# Patient Record
Sex: FEMALE | Race: BLACK OR AFRICAN AMERICAN | NOT HISPANIC OR LATINO | URBAN - METROPOLITAN AREA
[De-identification: names, ages, dates, MRNs, and addresses within clinical notes are randomized per-mention and may not be internally consistent; named-entity substitution may affect disease eponyms.]

---

## 2022-12-13 ENCOUNTER — APPOINTMENT (RX ONLY)
Dept: URBAN - METROPOLITAN AREA CLINIC 335 | Facility: CLINIC | Age: 37
Setting detail: DERMATOLOGY
End: 2022-12-13

## 2022-12-13 DIAGNOSIS — L24 IRRITANT CONTACT DERMATITIS: ICD-10-CM | Status: INADEQUATELY CONTROLLED

## 2022-12-13 DIAGNOSIS — L70.0 ACNE VULGARIS: ICD-10-CM | Status: INADEQUATELY CONTROLLED

## 2022-12-13 PROBLEM — L24.9 IRRITANT CONTACT DERMATITIS, UNSPECIFIED CAUSE: Status: ACTIVE | Noted: 2022-12-13

## 2022-12-13 PROCEDURE — ? PRESCRIPTION

## 2022-12-13 PROCEDURE — ? IN-HOUSE DISPENSING PHARMACY

## 2022-12-13 PROCEDURE — 99214 OFFICE O/P EST MOD 30 MIN: CPT

## 2022-12-13 PROCEDURE — ? COUNSELING

## 2022-12-13 RX ORDER — PIMECROLIMUS 10 MG/G
CREAM TOPICAL
Qty: 30 | Refills: 0 | Status: ERX | COMMUNITY
Start: 2022-12-13

## 2022-12-13 RX ORDER — DAPSONE 75 MG/G
GEL TOPICAL
Qty: 90 | Refills: 1 | Status: ERX | COMMUNITY
Start: 2022-12-13

## 2022-12-13 RX ADMIN — DAPSONE: 75 GEL TOPICAL at 00:00

## 2022-12-13 RX ADMIN — PIMECROLIMUS: 10 CREAM TOPICAL at 00:00

## 2022-12-13 ASSESSMENT — LOCATION DETAILED DESCRIPTION DERM
LOCATION DETAILED: RIGHT LATERAL SUPERIOR EYELID
LOCATION DETAILED: LEFT CENTRAL EYEBROW
LOCATION DETAILED: LEFT INFERIOR CENTRAL MALAR CHEEK

## 2022-12-13 ASSESSMENT — LOCATION ZONE DERM
LOCATION ZONE: FACE
LOCATION ZONE: EYELID

## 2022-12-13 ASSESSMENT — LOCATION SIMPLE DESCRIPTION DERM
LOCATION SIMPLE: RIGHT SUPERIOR EYELID
LOCATION SIMPLE: LEFT CHEEK
LOCATION SIMPLE: LEFT EYEBROW

## 2022-12-13 NOTE — PROCEDURE: COUNSELING
Bactrim Counseling:  I discussed with the patient the risks of sulfa antibiotics including but not limited to GI upset, allergic reaction, drug rash, diarrhea, dizziness, photosensitivity, and yeast infections.  Rarely, more serious reactions can occur including but not limited to aplastic anemia, agranulocytosis, methemoglobinemia, blood dyscrasias, liver or kidney failure, lung infiltrates or desquamative/blistering drug rashes.
Doxycycline Pregnancy And Lactation Text: This medication is Pregnancy Category D and not consider safe during pregnancy. It is also excreted in breast milk but is considered safe for shorter treatment courses.
Sarecycline Counseling: Patient advised regarding possible photosensitivity and discoloration of the teeth, skin, lips, tongue and gums.  Patient instructed to avoid sunlight, if possible.  When exposed to sunlight, patients should wear protective clothing, sunglasses, and sunscreen.  The patient was instructed to call the office immediately if the following severe adverse effects occur:  hearing changes, easy bruising/bleeding, severe headache, or vision changes.  The patient verbalized understanding of the proper use and possible adverse effects of sarecycline.  All of the patient's questions and concerns were addressed.
Azelaic Acid Pregnancy And Lactation Text: This medication is considered safe during pregnancy and breast feeding.
Erythromycin Pregnancy And Lactation Text: This medication is Pregnancy Category B and is considered safe during pregnancy. It is also excreted in breast milk.
Aklief Pregnancy And Lactation Text: It is unknown if this medication is safe to use during pregnancy.  It is unknown if this medication is excreted in breast milk.  Breastfeeding women should use the topical cream on the smallest area of the skin for the shortest time needed while breastfeeding.  Do not apply to nipple and areola.
Tazorac Counseling:  Patient advised that medication is irritating and drying.  Patient may need to apply sparingly and wash off after an hour before eventually leaving it on overnight.  The patient verbalized understanding of the proper use and possible adverse effects of tazorac.  All of the patient's questions and concerns were addressed.
Minocycline Counseling: Patient advised regarding possible photosensitivity and discoloration of the teeth, skin, lips, tongue and gums.  Patient instructed to avoid sunlight, if possible.  When exposed to sunlight, patients should wear protective clothing, sunglasses, and sunscreen.  The patient was instructed to call the office immediately if the following severe adverse effects occur:  hearing changes, easy bruising/bleeding, severe headache, or vision changes.  The patient verbalized understanding of the proper use and possible adverse effects of minocycline.  All of the patient's questions and concerns were addressed.
Topical Clindamycin Counseling: Patient counseled that this medication may cause skin irritation or allergic reactions.  In the event of skin irritation, the patient was advised to reduce the amount of the drug applied or use it less frequently.   The patient verbalized understanding of the proper use and possible adverse effects of clindamycin.  All of the patient's questions and concerns were addressed.
Azithromycin Counseling:  I discussed with the patient the risks of azithromycin including but not limited to GI upset, allergic reaction, drug rash, diarrhea, and yeast infections.
Use Enhanced Medication Counseling?: No
Detail Level: Zone
Birth Control Pills Pregnancy And Lactation Text: This medication should be avoided if pregnant and for the first 30 days post-partum.
High Dose Vitamin A Counseling: Side effects reviewed, pt to contact office should one occur.
Tetracycline Pregnancy And Lactation Text: This medication is Pregnancy Category D and not consider safe during pregnancy. It is also excreted in breast milk.
Winlevi Pregnancy And Lactation Text: This medication is considered safe during pregnancy and breastfeeding.
Dapsone Pregnancy And Lactation Text: This medication is Pregnancy Category C and is not considered safe during pregnancy or breast feeding.
Benzoyl Peroxide Counseling: Patient counseled that medicine may cause skin irritation and bleach clothing.  In the event of skin irritation, the patient was advised to reduce the amount of the drug applied or use it less frequently.   The patient verbalized understanding of the proper use and possible adverse effects of benzoyl peroxide.  All of the patient's questions and concerns were addressed.
Topical Sulfur Applications Pregnancy And Lactation Text: This medication is Pregnancy Category C and has an unknown safety profile during pregnancy. It is unknown if this topical medication is excreted in breast milk.
Isotretinoin Counseling: Patient should get monthly blood tests, not donate blood, not drive at night if vision affected, not share medication, and not undergo elective surgery for 6 months after tx completed. Side effects reviewed, pt to contact office should one occur.
Spironolactone Pregnancy And Lactation Text: This medication can cause feminization of the male fetus and should be avoided during pregnancy. The active metabolite is also found in breast milk.
Topical Retinoid counseling:  Patient advised to apply a pea-sized amount only at bedtime and wait 30 minutes after washing their face before applying.  If too drying, patient may add a non-comedogenic moisturizer. The patient verbalized understanding of the proper use and possible adverse effects of retinoids.  All of the patient's questions and concerns were addressed.
Azithromycin Pregnancy And Lactation Text: This medication is considered safe during pregnancy and is also secreted in breast milk.
Erythromycin Counseling:  I discussed with the patient the risks of erythromycin including but not limited to GI upset, allergic reaction, drug rash, diarrhea, increase in liver enzymes, and yeast infections.
Topical Clindamycin Pregnancy And Lactation Text: This medication is Pregnancy Category B and is considered safe during pregnancy. It is unknown if it is excreted in breast milk.
Bactrim Pregnancy And Lactation Text: This medication is Pregnancy Category D and is known to cause fetal risk.  It is also excreted in breast milk.
Aklief counseling:  Patient advised to apply a pea-sized amount only at bedtime and wait 30 minutes after washing their face before applying.  If too drying, patient may add a non-comedogenic moisturizer.  The most commonly reported side effects including irritation, redness, scaling, dryness, stinging, burning, itching, and increased risk of sunburn.  The patient verbalized understanding of the proper use and possible adverse effects of retinoids.  All of the patient's questions and concerns were addressed.
Tazorac Pregnancy And Lactation Text: This medication is not safe during pregnancy. It is unknown if this medication is excreted in breast milk.
Doxycycline Counseling:  Patient counseled regarding possible photosensitivity and increased risk for sunburn.  Patient instructed to avoid sunlight, if possible.  When exposed to sunlight, patients should wear protective clothing, sunglasses, and sunscreen.  The patient was instructed to call the office immediately if the following severe adverse effects occur:  hearing changes, easy bruising/bleeding, severe headache, or vision changes.  The patient verbalized understanding of the proper use and possible adverse effects of doxycycline.  All of the patient's questions and concerns were addressed.
Azelaic Acid Counseling: Patient counseled that medicine may cause skin irritation and to avoid applying near the eyes.  In the event of skin irritation, the patient was advised to reduce the amount of the drug applied or use it less frequently.   The patient verbalized understanding of the proper use and possible adverse effects of azelaic acid.  All of the patient's questions and concerns were addressed.
Dapsone Counseling: I discussed with the patient the risks of dapsone including but not limited to hemolytic anemia, agranulocytosis, rashes, methemoglobinemia, kidney failure, peripheral neuropathy, headaches, GI upset, and liver toxicity.  Patients who start dapsone require monitoring including baseline LFTs and weekly CBCs for the first month, then every month thereafter.  The patient verbalized understanding of the proper use and possible adverse effects of dapsone.  All of the patient's questions and concerns were addressed.
Isotretinoin Pregnancy And Lactation Text: This medication is Pregnancy Category X and is considered extremely dangerous during pregnancy. It is unknown if it is excreted in breast milk.
High Dose Vitamin A Pregnancy And Lactation Text: High dose vitamin A therapy is contraindicated during pregnancy and breast feeding.
Topical Retinoid Pregnancy And Lactation Text: This medication is Pregnancy Category C. It is unknown if this medication is excreted in breast milk.
Topical Sulfur Applications Counseling: Topical Sulfur Counseling: Patient counseled that this medication may cause skin irritation or allergic reactions.  In the event of skin irritation, the patient was advised to reduce the amount of the drug applied or use it less frequently.   The patient verbalized understanding of the proper use and possible adverse effects of topical sulfur application.  All of the patient's questions and concerns were addressed.
Benzoyl Peroxide Pregnancy And Lactation Text: This medication is Pregnancy Category C. It is unknown if benzoyl peroxide is excreted in breast milk.
Spironolactone Counseling: Patient advised regarding risks of diarrhea, abdominal pain, hyperkalemia, birth defects (for female patients), liver toxicity and renal toxicity. The patient may need blood work to monitor liver and kidney function and potassium levels while on therapy. The patient verbalized understanding of the proper use and possible adverse effects of spironolactone.  All of the patient's questions and concerns were addressed.
Tetracycline Counseling: Patient counseled regarding possible photosensitivity and increased risk for sunburn.  Patient instructed to avoid sunlight, if possible.  When exposed to sunlight, patients should wear protective clothing, sunglasses, and sunscreen.  The patient was instructed to call the office immediately if the following severe adverse effects occur:  hearing changes, easy bruising/bleeding, severe headache, or vision changes.  The patient verbalized understanding of the proper use and possible adverse effects of tetracycline.  All of the patient's questions and concerns were addressed. Patient understands to avoid pregnancy while on therapy due to potential birth defects.
Birth Control Pills Counseling: Birth Control Pill Counseling: I discussed with the patient the potential side effects of OCPs including but not limited to increased risk of stroke, heart attack, thrombophlebitis, deep venous thrombosis, hepatic adenomas, breast changes, GI upset, headaches, and depression.  The patient verbalized understanding of the proper use and possible adverse effects of OCPs. All of the patient's questions and concerns were addressed.
Winlevi Counseling:  I discussed with the patient the risks of topical clascoterone including but not limited to erythema, scaling, itching, and stinging. Patient voiced their understanding.

## 2022-12-13 NOTE — HPI: ACNE (PATIENT REPORTED)
Where Is Your Acne Located?: Face
List Over The Counter Products You Tried (Separate Each Name With A Comma):: Tretinoin, lightning serum.

## 2022-12-13 NOTE — PROCEDURE: IN-HOUSE DISPENSING PHARMACY
Product 55 Unit Type: mg
Product 19 Refills: 3
Product 32 Refills: 0
Name Of Product 5: Actinic Keratosis Gel (IMIQUIMOD 5% / LEVOCETIRIZINE DIHYDROCHLORIDE 1% / TRETINOIN 0.05%)
Product 21 Application Directions: Take 1 capsule by mouth twice daily
Product 13 Application Directions: Apply to affected areas of the face twice daily.
Product 11 Refills: 2
Product 23 Amount/Unit (Numbers Only): 5
Product 7 Price/Unit (In Dollars): 55
Name Of Product 26: Cephalexin
Product 2 Application Directions: Apply a thin layer to face every night
Product 19 Amount/Unit (Numbers Only): 60
Product 11 Amount/Unit (Numbers Only): 30
Product 21 Unit Type: capsules
Product 9 Application Directions: Apply to affected area twice a day, three times a week
Product 24 Price/Unit (In Dollars): 10
Product 5 Application Directions: Apply to the affected areas as directed by your physician. You may experience mild skin irritation, itching, dryness, flaking, scabbing, crusting, redness, or hardening of the skin where medicine was applied. It is important to \\navoid excessive sun exposure and to use sunscreen of 30 SPF or higher.
Name Of Product 22: Prednisone 20 mg
Name Of Product 14: Rosacea Silicone Gel (IVERMECTIN 1% / METRONIDAZOLE 1% / NIACINAMIDE 4% / POTASSIUM AZELOYL DIGLYCINATE 5%)
Product 16 Price/Unit (In Dollars): 35
Product 9 Unit Type: ml
Product 7 Amount/Unit (Numbers Only): 15
Product 26 Application Directions: Take 1 capsule twice daily for 1 week
Product 5 Unit Type: grams
Name Of Product 3: Acne gel combo  (BENZOYL PEROXIDE 5% / CLINDAMYCIN 1% / NIACINAMIDE 4%)
Name Of Product 10: Fungal dermatitis cream ( (HYDROCORTISONE 2.5% / KETOCONAZOLE 2%)
Product 18 Amount/Unit (Numbers Only): 20
Product 26 Unit Type: tablets
Product 22 Application Directions: Take 3 tablets by mouth for 3 days, Then take 2 tablets for 3 days, then take 1 tablet for 3 days
Product 16 Application Directions: Apply a small amount to affected areas three times a week
Name Of Product 6: Alopecia Solution (FLUOCINOLONE ACETONIDE 0.01% / MINOXIDIL 5% / TRETINOIN 0.025%)
Product 14 Application Directions: Apply a small amount to face once daily
Product 12 Refills: 4
Product 12 Application Directions: Apply a thin layer of Melasma Emulsion to the entire face at night time. For ultimate results we recommend using the Melasma Emulsion with Lytera, a product sold through our office. Every morning you will apply  a thin layer  of Lytera to the entire face. You will do the above regimen for 2 months. After 2 months, you will temporarily discontinue the Melasma Emulsion & only use the Lytera twice a day for 2 months. After the 4 months, we recommend that you have a follow up appointment with your provider to evaluate if any other changes are needed.   If the  skin gets too dry or irritated with the Melasma Emulsion, then use it every third night.  The Melasma Emulsion will make your skin more sun-sensitive. Use a sunscreen daily of at least SPF 30, also \\n reapplying throughout the day is very important. Because Melasma Emulsion not meant to be used long term due to the potential side effects, we recommend to use lytera on your off days.
Product 22 Amount/Unit (Numbers Only): 18
Product 20 Application Directions: Take 1 tablet by mouth daily
Name Of Product 4: Acne gel  (ADAPALENE 0.3% / BENZOYL PEROXIDE 2.5% / NIACINAMIDE 4%)
Name Of Product 19: Doxycycline
Name Of Product 25: Minocycline
Product 1 Application Directions: Apply a thin layer to face every morning
Product 8 Application Directions: Apply to affected area twice daily
Name Of Product 13: Rosacea Cream (AZELAIC ACID 15% / NIACINAMIDE 4%)
Product 6 Amount/Unit (Numbers Only): 45
Name Of Product 2: Acne moisturizing cream (NIACINAMIDE 4% / TRETINOIN 0.05% / HYALURONIC ACID 0.5% CREAM)
Name Of Product 9: Dermatitis cream  (CLOBETASOL PROPIONATE 0.05% / NIACINAMIDE 4%)
Product 17 Amount/Unit (Numbers Only): 120
Name Of Product 7: Anti-fungal nail solution (CICLOPIROX 8% / FLUCONAZOLE 1% / TERBINAFINE HCL 1%)
Product 11 Application Directions: Apply to affected area twice a day
Product 19 Application Directions: Take 1 Capsule twice daily
Send Charges To Patient Encounter: Yes
Name Of Product 16: Dermatitis topical solution (CLOBETASOL PROPIONATE 0.05% / NIACINAMIDE 4%)
Name Of Product 18: Bactrim DS
Name Of Product 24: Spironlactone
Product 7 Application Directions: Apply to the affected nails every night. Once a week clean nails off     \\nwith alcohol or acetone.
Name Of Product 20: Loratadine
Name Of Product 12: Melasma Emulsion  (HYDROQUINONE 4% / TRETINOIN 0.025% / TRIAMCINOLONE ACETONIDE 0.025%)
Product 26 Amount/Unit (Numbers Only): 14
Product 18 Application Directions: Take 1 tablet twice daily for 1 week
Product 24 Application Directions: Take 1 tablet twice daily
Product 12 Units Dispensed: 1
Name Of Product 1: Acne Gel (DAPSONE 8.5% / NIACINAMIDE 4%)
Product 3 Price/Unit (In Dollars): 40
Name Of Product 8: Anti-fungal cream (ECONAZOLE NITRATE 1% / NIACINAMIDE 4%)
Product 10 Application Directions: Apply to affected are twice a day
Name Of Product 15: Anti-Fungal Shampoo ( KETOCONAZOLE 2% / SALICYLIC ACID 2% / ZINC PYRITHIONE 0.2%)
Name Of Product 17: Xerosis Gel ( ALOE VERA 1% / LACTIC ACID 10% / UREA 40%)
Name Of Product 23: Prednisone 50 mg
Product 6 Application Directions: Apply to the affected areas on the scalp Monday, Wednesday and Friday.
Name Of Product 11: Antibacterial ointment ( METRONIDAZOLE 1% / MUPIROCIN 2%)
Product 23 Application Directions: Take 1 tablet every morning for 5 days
Product 17 Application Directions: Apply a small amount to affected areas twice daily
Detail Level: Zone
Product 15 Application Directions: Shampoo into hair three times a week
Product 2 Price/Unit (In Dollars): 50

## 2023-02-07 ENCOUNTER — APPOINTMENT (RX ONLY)
Dept: URBAN - METROPOLITAN AREA CLINIC 335 | Facility: CLINIC | Age: 38
Setting detail: DERMATOLOGY
End: 2023-02-07

## 2023-02-07 DIAGNOSIS — L24 IRRITANT CONTACT DERMATITIS: ICD-10-CM | Status: INADEQUATELY CONTROLLED

## 2023-02-07 PROBLEM — L24.9 IRRITANT CONTACT DERMATITIS, UNSPECIFIED CAUSE: Status: ACTIVE | Noted: 2023-02-07

## 2023-02-07 PROCEDURE — ? COUNSELING

## 2023-02-07 PROCEDURE — ? PRESCRIPTION MEDICATION MANAGEMENT

## 2023-02-07 PROCEDURE — 99213 OFFICE O/P EST LOW 20 MIN: CPT

## 2023-02-07 PROCEDURE — ? PRESCRIPTION

## 2023-02-07 RX ORDER — HYDROCORTISONE 25 MG/G
CREAM TOPICAL
Qty: 30 | Refills: 2 | Status: ERX | COMMUNITY
Start: 2023-02-07

## 2023-02-07 RX ORDER — PREDNISONE 20 MG/1
TABLET ORAL
Qty: 6 | Refills: 0 | Status: ERX | COMMUNITY
Start: 2023-02-07

## 2023-02-07 RX ADMIN — PREDNISONE: 20 TABLET ORAL at 00:00

## 2023-02-07 RX ADMIN — HYDROCORTISONE: 25 CREAM TOPICAL at 00:00

## 2023-02-07 ASSESSMENT — LOCATION DETAILED DESCRIPTION DERM: LOCATION DETAILED: LEFT INFERIOR CENTRAL MALAR CHEEK

## 2023-02-07 ASSESSMENT — LOCATION ZONE DERM: LOCATION ZONE: FACE

## 2023-02-07 ASSESSMENT — LOCATION SIMPLE DESCRIPTION DERM: LOCATION SIMPLE: LEFT CHEEK

## 2023-02-07 NOTE — PROCEDURE: PRESCRIPTION MEDICATION MANAGEMENT
Render In Strict Bullet Format?: No
Detail Level: Zone
Discontinue Regimen: Elidel \\nAczone \\nUntil rash is resolved

## 2024-05-23 ENCOUNTER — APPOINTMENT (RX ONLY)
Dept: URBAN - METROPOLITAN AREA CLINIC 335 | Facility: CLINIC | Age: 39
Setting detail: DERMATOLOGY
End: 2024-05-23

## 2024-05-23 DIAGNOSIS — L57.8 OTHER SKIN CHANGES DUE TO CHRONIC EXPOSURE TO NONIONIZING RADIATION: ICD-10-CM | Status: INADEQUATELY CONTROLLED

## 2024-05-23 DIAGNOSIS — Q819 OTHER SPECIFIED ANOMALIES OF SKIN: ICD-10-CM | Status: INADEQUATELY CONTROLLED

## 2024-05-23 DIAGNOSIS — Q828 OTHER SPECIFIED ANOMALIES OF SKIN: ICD-10-CM | Status: INADEQUATELY CONTROLLED

## 2024-05-23 DIAGNOSIS — Q826 OTHER SPECIFIED ANOMALIES OF SKIN: ICD-10-CM | Status: INADEQUATELY CONTROLLED

## 2024-05-23 PROBLEM — L85.8 OTHER SPECIFIED EPIDERMAL THICKENING: Status: ACTIVE | Noted: 2024-05-23

## 2024-05-23 PROCEDURE — ? PRODUCT LINE (REVISION)

## 2024-05-23 PROCEDURE — ? RECOMMENDATIONS

## 2024-05-23 PROCEDURE — 99213 OFFICE O/P EST LOW 20 MIN: CPT

## 2024-05-23 PROCEDURE — ? COUNSELING

## 2024-05-23 PROCEDURE — ? ADDITIONAL NOTES

## 2024-05-23 PROCEDURE — ? PRESCRIPTION

## 2024-05-23 RX ORDER — HYDROQUINO/TRETINOIN/TRIAMCINO 4 %-0.025%
EMULSION (GRAM) TOPICAL
Qty: 30 | Refills: 0 | Status: ERX | COMMUNITY
Start: 2024-05-23

## 2024-05-23 RX ADMIN — Medication: at 00:00

## 2024-05-23 ASSESSMENT — LOCATION SIMPLE DESCRIPTION DERM
LOCATION SIMPLE: LEFT POSTERIOR UPPER ARM
LOCATION SIMPLE: RIGHT POSTERIOR UPPER ARM
LOCATION SIMPLE: LEFT THIGH
LOCATION SIMPLE: LEFT CHEEK
LOCATION SIMPLE: RIGHT THIGH

## 2024-05-23 ASSESSMENT — LOCATION DETAILED DESCRIPTION DERM
LOCATION DETAILED: RIGHT PROXIMAL POSTERIOR UPPER ARM
LOCATION DETAILED: LEFT ANTERIOR PROXIMAL THIGH
LOCATION DETAILED: LEFT PROXIMAL POSTERIOR UPPER ARM
LOCATION DETAILED: LEFT CENTRAL MALAR CHEEK
LOCATION DETAILED: RIGHT ANTERIOR PROXIMAL THIGH

## 2024-05-23 ASSESSMENT — LOCATION ZONE DERM
LOCATION ZONE: FACE
LOCATION ZONE: LEG
LOCATION ZONE: ARM

## 2024-05-23 NOTE — PROCEDURE: RECOMMENDATIONS
Detail Level: Zone
Render Risk Assessment In Note?: no
Recommendation Preamble: The following recommendations were made during the visit: KP kit

## 2024-05-23 NOTE — PROCEDURE: PRODUCT LINE (REVISION)
Product 47 Price (In Dollars - Numeric Only, No Special Characters Or $): 0.00
Product 4 Units: 0
Product 3 Application Directions: Using a circular motion, massage into skin until fully absorbed. Apply twice daily. To see optimal results, it is recommended to exfoliate twice a week using a loofah or physical exfoliator.
Allow Plan To Count Towards E/M Coding: Yes
Product 4 Application Directions: Dispense two pumps into palm of hand. Using upward strokes, gently apply onto décolletage, working your way up the neck to the jawline. Use twice daily
Product 8 Application Directions: Apply to clean skin prior to moisturizer. Dispense desired amount and use applicator to massage serum onto any fine lines and wrinkles. You will feel a cooling effect from the customized metal tip. Avoid direct eye contact. Use twice daily. NOTE: A thin layer of product is all that is needed. No need for heavy application. (Packaging contains slightly different verbiage.
Name Of Product 4: Nectifirm®
Name Of Product 5: YouthFull Lip Replenisher®
Product 4 Price (In Dollars - Numeric Only, No Special Characters Or $): 98.00
Product 5 Price (In Dollars - Numeric Only, No Special Characters Or $): 38.00
Product 5 Application Directions: Glide onto lips in a massaging motion. Use three times daily for optimal results or as needed. Use alone or layered over your favorite lip color
Name Of Product 6: Retinol Complete® 1.0
Product 1 Price (In Dollars - Numeric Only, No Special Characters Or $): 170.00
Name Of Product 1: C+ Correcting Complex 30%
Product 6 Price (In Dollars - Numeric Only, No Special Characters Or $): 127.00
Product 1 Units: 1
Product 1 Application Directions: Use morning and evening after cleansing, but before moisturizing. Dispense one pump into palm of hand and apply evenly to the face, avoiding the eye area.
Product 6 Application Directions: Use only at night. After cleansing, dispense one to two pumps on back of hand. Apply to face two to three times per week, avoiding the eye area. Increase usage as tolerated.
Name Of Product 2: [id number]\\nGentle Cleansing Lotion
Name Of Product 7: Retinol Complete® 0.5
Product 2 Price (In Dollars - Numeric Only, No Special Characters Or $): 40.00
Product 7 Price (In Dollars - Numeric Only, No Special Characters Or $): 104.00
Product 7 Application Directions: Use only at night. After cleansing, dispense one to two pumps on back of hand. Apply to face two to three times per week, avoiding the eye area. Increase usage as tolerated
Detail Level: Zone
Product 2 Application Directions: Wet face with tepid water. Dispense a quarter-sized amount into palm of hand. Using fingertips, gently massage onto face using circular motions. Rinse thoroughly and pat skin dry. Use twice daily, morning and evening.
Name Of Product 3: BodiFirm™
Product 8 Price (In Dollars - Numeric Only, No Special Characters Or $): 151.00
Assigning Risk Information: Per AMA, level of risk is based upon consequences of the problem(s) addressed at the encounter when appropriately treated. Risk also includes medical decision making related to the need to initiate or forego further testing, treatment and/or hospitalization. Over the counter medication are assigned a risk level of low. Prescription medication management is assigned a risk level of moderate.
Name Of Product 8: Revox™ Line Relaxer
Product 3 Price (In Dollars - Numeric Only, No Special Characters Or $): 156.00
Risk Of Complication Category: No MDM